# Patient Record
Sex: FEMALE | Race: WHITE | NOT HISPANIC OR LATINO | Employment: STUDENT | URBAN - METROPOLITAN AREA
[De-identification: names, ages, dates, MRNs, and addresses within clinical notes are randomized per-mention and may not be internally consistent; named-entity substitution may affect disease eponyms.]

---
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
## 2020-09-29 ENCOUNTER — COMMUNICATION - HEALTHEAST (OUTPATIENT)
Dept: SCHEDULING | Facility: CLINIC | Age: 20
End: 2020-09-29

## 2021-03-03 ENCOUNTER — COMMUNICATION - HEALTHEAST (OUTPATIENT)
Dept: FAMILY MEDICINE | Facility: CLINIC | Age: 21
End: 2021-03-03

## 2021-04-27 ENCOUNTER — OFFICE VISIT - HEALTHEAST (OUTPATIENT)
Dept: FAMILY MEDICINE | Facility: CLINIC | Age: 21
End: 2021-04-27

## 2021-04-27 ENCOUNTER — COMMUNICATION - HEALTHEAST (OUTPATIENT)
Dept: SCHEDULING | Facility: CLINIC | Age: 21
End: 2021-04-27

## 2021-04-27 DIAGNOSIS — J02.0 STREP PHARYNGITIS: ICD-10-CM

## 2021-04-27 LAB — DEPRECATED S PYO AG THROAT QL EIA: ABNORMAL

## 2021-05-04 ENCOUNTER — OFFICE VISIT - HEALTHEAST (OUTPATIENT)
Dept: FAMILY MEDICINE | Facility: CLINIC | Age: 21
End: 2021-05-04

## 2021-05-04 DIAGNOSIS — T83.32XA INTRAUTERINE CONTRACEPTIVE DEVICE THREADS LOST, INITIAL ENCOUNTER: ICD-10-CM

## 2021-05-27 VITALS — DIASTOLIC BLOOD PRESSURE: 58 MMHG | SYSTOLIC BLOOD PRESSURE: 100 MMHG | WEIGHT: 135.8 LBS | HEART RATE: 68 BPM

## 2021-05-31 VITALS — WEIGHT: 139.25 LBS

## 2021-05-31 VITALS — WEIGHT: 141.25 LBS

## 2021-06-01 NOTE — PROGRESS NOTES
ASSESSMENT/PLAN:  Intrauterine contraceptive device threads lost, initial encounter  -     US Pelvis With Transvaginal Non OB; Future    Vaginal bleeding  -     Wet Prep, Vaginal  -     Chlamydia trachomatis & Neisseria gonorrhoeae, Amplified Detection  -     US Pelvis With Transvaginal Non OB; Future    Orders Placed This Encounter   Procedures     Wet Prep, Vaginal     Chlamydia trachomatis & Neisseria gonorrhoeae, Amplified Detection     Order Specific Question:   Specimen Source?     Answer:   Endocervix     US Pelvis With Transvaginal Non OB     Standing Status:   Future     Standing Expiration Date:   10/1/2020     Order Specific Question:   Is the patient pregnant?     Answer:   No     Order Specific Question:   Can the procedure be changed per Radiologist protocol?     Answer:   Yes           CHIEF COMPLAINT:  Chief Complaint   Patient presents with     IUD check       HISTORY OF PRESENT ILLNESS:  Scarlet is a 18 y.o. female presenting to the clinic today for abnormal menstruation.     Abnormal Menstruation: She had a Kyleena IUD placed on November 16, 2017. She has been checking her strings since she got it. Since it was placed, she stopped menstruating and cramping. However, the last time she checked she could not find the strings. She also noticed that she randomly started menstruating and cramping again. She did not see the Kyleena fall out. She is concerned that the IUD is not correctly placed. She denies any other vaginal symptoms. She is not concerned about STDs.       REVIEW OF SYSTEMS:   Constitutional: Negative.   HENT: Negative.   Eyes: Negative.   Respiratory: Negative.   Cardiovascular: Negative.   Gastrointestinal: Negative.   Endocrine: Negative.   Genitourinary: Negative.   Musculoskeletal: Negative.   Skin: Negative.   Allergic/Immunologic: Negative.   Neurological: Negative.   Hematological: Negative.   Psychiatric/Behavioral: Negative.   All other systems are negative.    TOBACCO  "USE:  Social History     Tobacco Use   Smoking Status Never Smoker   Smokeless Tobacco Never Used   Tobacco Comment    no exposure        VITALS:  Vitals:    10/01/19 1111   BP: 100/70   Patient Site: Left Arm   Patient Position: Sitting   Cuff Size: Adult Regular   Pulse: 60   Weight: 142 lb 3 oz (64.5 kg)   Height: 5' 5\" (1.651 m)     Wt Readings from Last 3 Encounters:   10/01/19 142 lb 3 oz (64.5 kg) (74 %, Z= 0.66)*   11/16/17 139 lb 4 oz (63.2 kg) (77 %, Z= 0.73)*   09/07/17 141 lb 4 oz (64.1 kg) (79 %, Z= 0.82)*     * Growth percentiles are based on CDC (Girls, 2-20 Years) data.       PHYSICAL EXAM:  Constitutional: Patient is oriented to person, place, and time. Patient appears well-developed and well-nourished. No distress.   Head: Normocephalic and atraumatic.   Right Ear: External ear normal.   Left Ear: External ear normal.   Nose: Nose normal.   Eyes: Conjunctivae and EOM are normal. Right eye exhibits no discharge. Left eye exhibits no discharge. No scleral icterus.   Pelvic:  Normal external genitalia with Normal vulva.  Normal vagina with no polyps or lesions and with physiologic discharge.  Normal cervix with normal mucosa and without CMT.  No adnexal masses. IUD strings are non-visualized.   Neurological: Patient is alert and oriented to person, place, and time. No cranial nerve deficit. Coordination normal.   Skin: No rash noted. Patient is not diaphoretic. No erythema. No pallor.    ADDITIONAL HISTORY SUMMARIZED (2): None.  DECISION TO OBTAIN EXTRA INFORMATION (1): None.   RADIOLOGY TESTS (1): Ordered US Pelvic today.   LABS (1): Ordered a Wet Prep today.   MEDICINE TESTS (1): None.  INDEPENDENT REVIEW (2 each): None.     Yvonne GIBBS,  am scribing for and in the presence of, Dr. Ventura.    Dr. Audrey GIBBS, personally performed the services described in this documentation, as scribed by Yvonne Owusu in my presence, and it is both accurate and complete.    MEDICATIONS:  Current Outpatient " Medications   Medication Sig Dispense Refill     levonorgestrel (KYLEENA) 17.5 mcg/24 hrs (5 yrs) 19.5 mg IUD IUD by Intrauterine route once.       SPRINTEC, 28, 0.25-35 mg-mcg per tablet TAKE 1 TABLET BY MOUTH DAILY. 84 tablet 3     No current facility-administered medications for this visit.        Total data points: 2

## 2021-06-03 VITALS
WEIGHT: 142.19 LBS | HEART RATE: 60 BPM | HEIGHT: 65 IN | BODY MASS INDEX: 23.69 KG/M2 | SYSTOLIC BLOOD PRESSURE: 100 MMHG | DIASTOLIC BLOOD PRESSURE: 70 MMHG

## 2021-06-05 VITALS
OXYGEN SATURATION: 99 % | TEMPERATURE: 97.5 F | SYSTOLIC BLOOD PRESSURE: 98 MMHG | DIASTOLIC BLOOD PRESSURE: 70 MMHG | HEART RATE: 106 BPM | WEIGHT: 136 LBS | BODY MASS INDEX: 22.63 KG/M2

## 2021-06-12 NOTE — PROGRESS NOTES
ASSESSMENT/PLAN:  Menorrhagia even on oral contraceptives  Spoke about various forms of birth control from oral, transdermal, intravaginal, intramuscular, subdermal, intrauterine.  Also spoke about hormonal versus nonhormonal form of birth control.  Patient verbalized interest in the Kyleena IUD.  We spoke about the procedure, risks and benefit, and timing of insertion.  She will come back during her period week for insertion of Kyleena IUD.  Patient verbalized understanding and agree with plan    Need for immunization against influenza  -     Influenza, Seasonal,Quad Inj, 36+ MOS    Orders Placed This Encounter   Procedures     Influenza, Seasonal,Quad Inj, 36+ MOS     CHIEF COMPLAINT:  Chief Complaint   Patient presents with     menstrual cycle issue     on BC and bleeding can last for 3 weeks       HISTORY OF PRESENT ILLNESS:  Scarlet is a 16 y.o. female presenting to the clinic today for irregular vaginal bleeding. Here with mom today. She is currently taking OCP's. Menarche at age 14. She did have heavy, irregular periods, and this is why she was put on OCP's. Taking the OCP's have not controlled the bleeding very well. She still has periods that last 2 weeks. The periods are still heavy on the OCP's. She does not have much pain with her periods. Mom notes that she also had heavy irregular periods as a teenager. She is currently on her period. She and mom have discussed the possibility of an IUD to help her control the bleeding. Not sexually active. No concerns for STD's.     REVIEW OF SYSTEMS:   Constitutional: Negative.   HENT: Negative.   Eyes: Negative.   Respiratory: Negative.   Cardiovascular: Negative.   Gastrointestinal: Negative.   Endocrine: Negative.   Genitourinary: Negative.   Musculoskeletal: Negative.   Skin: Negative.   Allergic/Immunologic: Negative.   Neurological: Negative.   Hematological: Negative.   Psychiatric/Behavioral: Negative.   All other systems are negative.    PFSH:  Family  history of endometriosis in mother.     TOBACCO USE:  History   Smoking Status     Never Smoker   Smokeless Tobacco     Not on file     Comment: no exposure        VITALS:  Vitals:    09/07/17 1538   BP: 104/54   Patient Site: Left Arm   Patient Position: Sitting   Cuff Size: Adult Regular   Pulse: 72   Weight: 141 lb 4 oz (64.1 kg)     Wt Readings from Last 3 Encounters:   09/07/17 141 lb 4 oz (64.1 kg) (79 %, Z= 0.82)*   09/12/16 125 lb (56.7 kg) (62 %, Z= 0.30)*   06/01/16 123 lb 8 oz (56 kg) (61 %, Z= 0.28)*     * Growth percentiles are based on CDC 2-20 Years data.       PHYSICAL EXAM:  Constitutional: Patient is oriented to person, place, and time. Patient appears well-developed and well-nourished. No distress.   Cardiovascular: Normal rate.  Pulmonary/Chest: Effort normal. No respiratory distress.   Neurological: Patient is alert and oriented to person, place, and time.      Results for orders placed or performed in visit on 06/01/16   HM2(CBC w/o Differential)   Result Value Ref Range    WBC 6.4 4.5 - 13.0 thou/uL    RBC 4.02 (L) 4.10 - 5.10 mill/uL    Hemoglobin 12.7 12.0 - 16.0 g/dL    Hematocrit 37.0 33.0 - 51.0 %    MCV 92 78 - 102 fL    MCH 31.6 25.0 - 35.0 pg    MCHC 34.3 32.0 - 36.0 g/dL    RDW 11.2 (L) 11.5 - 14.0 %    Platelets 255 140 - 440 thou/uL    MPV 7.9 7.0 - 10.0 fL           ADDITIONAL HISTORY SUMMARIZED (2): Reviewed note from Dr. Rosa from 7/22/2015 regarding OCP's and irregular periods.  DECISION TO OBTAIN EXTRA INFORMATION (1): None.   RADIOLOGY TESTS (1): None.  LABS (1): None.  MEDICINE TESTS (1): None.  INDEPENDENT REVIEW (2 each): None.     The visit lasted a total of 25 minutes face to face with the patient. Over 50% of the time was spent counseling and educating the patient about her irregular periods.    Amber GIBBS, am scribing for and in the presence of, Dr. Ventura.    IDacia MD , personally performed the services described in this  documentation, as scribed by Amber Wilkins in my presence, and it is both accurate and complete.    MEDICATIONS:  Current Outpatient Prescriptions   Medication Sig Dispense Refill     SPRINTEC, 28, 0.25-35 mg-mcg per tablet TAKE 1 TABLET BY MOUTH DAILY. 84 tablet 3     No current facility-administered medications for this visit.        Total data points: 2

## 2021-06-14 NOTE — PROGRESS NOTES
IUD Insertion Procedure Note    Pre-operative Diagnosis: Contraception, Kyleena IUD Insertion    Post-operative Diagnosis: same    Indications: contraception    Procedure Details   Urine pregnancy test was done and result was negative.  The risks (including infection, bleeding, pain, and uterine perforation) and benefits of the procedure were explained to the patient and Written informed consent was obtained.      Cervix cleansed with Betadine. Uterus sounded to 7 cm. IUD inserted without difficulty. String visible and trimmed. Patient tolerated procedure well.    Condition:  Stable    Complications:  None    Plan:  The patient was advised to call for any fever or for prolonged or severe pain or bleeding. She was advised to use OTC analgesics as needed for mild to moderate pain.  She was advised to feel for the strings in 2 weeks or come in if she is unable to feel the strings.

## 2021-06-15 NOTE — TELEPHONE ENCOUNTER
Please assist the patient with an in person appointment with me.  Thank you.  Have an awesome day.

## 2021-06-16 PROBLEM — Z30.430 ENCOUNTER FOR IUD INSERTION: Status: ACTIVE | Noted: 2017-11-16

## 2021-06-17 NOTE — PROGRESS NOTES
Assessment & Plan     Strep pharyngitis    Patient was prescribed antibiotics as noted, to take as directed.  Patient warned of potential side effects of antibiotics, including but not limited to:  Nausea, diarrhea, yeast infection, and limitation of OCP effectiveness.     I advised the patient to continue symptomatic treatment with the appropriate over-the-counter medications, to drink plenty of fluids and get rest, and to follow up here if symptoms worsen or don't improve.     - Rapid Strep A Screen- Throat Swab  - amoxicillin (AMOXIL) 500 MG tablet; Take 1 tablet (500 mg total) by mouth 3 (three) times a day for 10 days.    Review of the result(s) of each unique test - strep test  Ordering of each unique test  Prescription drug management  :[id number]}     No follow-ups on file.    Joe Woo MD  [facility]   Scarlet Castro is 20 y.o. and presents today for the following health issues   HPI     Patient is here today with a sore throat, which is started since about 3 days ago.  She also started getting bit of popping and pain in her left ear today.  She has been complaining of it being hard for her to swallow.  She has had no temperatures to speak of.  No loss of taste or smell, no muscle aches.  No sinus pressure or drainage and no postnasal drainage.    Review of Systems    A comprehensive Review of Systems was performed, and other than the positives mentioned above, the ROS was negative.       Objective    BP 98/70 (Patient Site: Left Arm, Patient Position: Sitting, Cuff Size: Adult Regular)   Pulse (!) 106   Temp 97.5  F (36.4  C) (Oral)   Wt 136 lb (61.7 kg)   SpO2 99%   BMI 22.63 kg/m    Body mass index is 22.63 kg/m .  Physical Exam    Well-appearing female in no acute distress.  Vital signs as noted.  Ears are clear bilaterally.  Eyes and nose are normal.  Oropharynx is red in the back of the throat.  There is no whitish exudate seen.  There is no  lymphadenopathy anteriorly.  Chest clear to auscultation.    Recent Results (from the past 24 hour(s))   Rapid Strep A Screen- Throat Swab    Specimen: Throat   Result Value Ref Range    Rapid Strep A Antigen Group A Strep detected (!) No Group A Strep detected, presumptive negative

## 2021-06-17 NOTE — TELEPHONE ENCOUNTER
Triage call:     Obtained consent to speak to patients mom   Sent home from work  Sore throat  First noted on Sunday   Hoarse voice   Rating pain severe   Able to swallow  Unknown exposure to strep  Pain in her ear- left ear   Mom states she has pus on her tonsils    Advised that patient should be seen in the office today. Reviewed additional care advice with mom and she verbalizes understanding. Assisted in transferring to scheduling.     Cathleen Phoenix RN BSBA [facility] Triage/Med Refill 4/27/2021 9:54 AM    COVID 19 Nurse Triage Plan/Patient Instructions    Please be aware that novel coronavirus (COVID-19) may be circulating in the community. If you develop symptoms such as fever, cough, or SOB or if you have concerns about the presence of another infection including coronavirus (COVID-19), please contact your health care provider or visit  [email or web address].    Disposition/Instructions    In-Person Visit with provider recommended. Reference Visit Selection Guide.    Thank you for taking steps to prevent the spread of this virus.  o Limit your contact with others.  o Wear a simple mask to cover your cough.  o Wash your hands well and often.    Resources    [facility]: About COVID-19: [email or web address]    CDC: What to Do If You're Sick: www.cdc.gov/coronavirus/2019-ncov/about/steps-when-sick.html    CDC: Ending Home Isolation: www.cdc.gov/coronavirus/2019-ncov/hcp/disposition-in-home-patients.html     CDC: Caring for Someone: www.cdc.gov/coronavirus/2019-ncov/if-you-are-sick/care-for-someone.html     [facility]: Interim Guidance for Hospital Discharge to Home: www.health.state.[place]/diseases/coronavirus/hcp/hospdischarge.pdf    [facility] clinical trials (COVID-19 research studies): [email or web address]/[email or web address]     Below are the COVID-19 hotlines at the [place] Department of Health ([facility]). Interpreters are available.   o For health questions: Call  [phone] or 1-888.166.8152 (7 a.m. to 7 p.m.)  o For questions about schools and childcare: Call 438-924-7318 or 1-832.955.4710 (7 a.m. to 7 p.m.)         Reason for Disposition    SEVERE sore throat pain    Additional Information    Negative: SEVERE difficulty breathing (e.g., struggling for each breath, speaks in single words)    Negative: Sounds like a life-threatening emergency to the triager    Negative: Throat culture results, call about    Negative: Productive cough is the main symptom    Negative: Runny nose is the main symptom    Negative: Drooling or spitting out saliva (because can't swallow)    Negative: Unable to open mouth completely    Negative: Drinking very little and has signs of dehydration (e.g., no urine > 12 hours, very dry mouth, very lightheaded)    Negative: Patient sounds very sick or weak to the triager    Negative: Difficulty breathing (per caller) but not severe    Negative: Fever > 103 F (39.4 C)    Negative: Refuses to drink anything for > 12 hours    Protocols used: SORE THROAT-A-OH

## 2021-06-17 NOTE — PROGRESS NOTES
ASSESSMENT/PLAN:  Scarlet was seen today for contraception.    Diagnoses and all orders for this visit:    Intrauterine contraceptive device threads lost, initial encounter  -     US Pelvis With Transvaginal Non OB; Future  US to locate IUD      There are no Patient Instructions on file for this visit.    SUBJECTIVE:    Scarlet Castro is a 20 y.o. female who came in today     Kyleena IUD was placed November 16, 2017.  This year she started to notice lower abdominal cramping and bleeding.  Her mom asked her to come in to get her IUD checked.  She has been sexually active in the past but nothing currently.  She admitted to using condom for every sexual encounters that she has had in the past    Review of Systems (except those mentioned above)  Constitutional: Negative.   HENT: Negative.   Eyes: Negative.   Respiratory: Negative.   Cardiovascular: Negative.   Gastrointestinal: Negative.   Endocrine: Negative.   Genitourinary: Negative.   Musculoskeletal: Negative.   Skin: Negative.   Allergic/Immunologic: Negative.   Neurological: Negative.   Hematological: Negative.   Psychiatric/Behavioral: Negative.     Patient Active Problem List    Diagnosis Date Noted     Kyleena IUD inserted 11/16/17 11/16/2017     No Known Allergies  Current Outpatient Medications   Medication Sig Dispense Refill     levonorgestrel (KYLEENA) 17.5 mcg/24 hrs (5 yrs) 19.5 mg IUD IUD by Intrauterine route once.       amoxicillin (AMOXIL) 500 MG tablet Take 1 tablet (500 mg total) by mouth 3 (three) times a day for 10 days. 30 tablet 0     No current facility-administered medications for this visit.      No past medical history on file.  No past surgical history on file.  Social History     Socioeconomic History     Marital status: Single     Spouse name: None     Number of children: None     Years of education: None     Highest education level: None   Occupational History     None   Social Needs     Financial resource strain: None     Food  insecurity     Worry: None     Inability: None     Transportation needs     Medical: None     Non-medical: None   Tobacco Use     Smoking status: Never Smoker     Smokeless tobacco: Never Used     Tobacco comment: no exposure    Substance and Sexual Activity     Alcohol use: None     Drug use: None     Sexual activity: None   Lifestyle     Physical activity     Days per week: None     Minutes per session: None     Stress: None   Relationships     Social connections     Talks on phone: None     Gets together: None     Attends [employer] service: None     Active member of club or organization: None     Attends meetings of clubs or organizations: None     Relationship status: None     Intimate partner violence     Fear of current or ex partner: None     Emotionally abused: None     Physically abused: None     Forced sexual activity: None   Other Topics Concern     None   Social History Narrative     None     No family history on file.      OBJECTIVE:    Vitals:    05/04/21 1004   BP: 100/58   Patient Site: Left Arm   Patient Position: Sitting   Cuff Size: Adult Regular   Pulse: 68   Weight: 135 lb 12.8 oz (61.6 kg)     Body mass index is 22.6 kg/m .    Physical Exam:  Constitutional: Patient was oriented to person, place, and time. Patient appeared well-developed and well-nourished. No distress.   Head: Normocephalic and atraumatic.   Right Ear: External ear normal.   Left Ear: External ear normal.   Eyes: Conjunctivae and EOM were normal. Right eye exhibited no discharge. Left eye exhibited no discharge. No scleral icterus.   Skin: Skin was warm and dry. No rash noted. Patient was not diaphoretic. No erythema. No pallor.   Pelvic: Blood at the external os.  Strings were not visualized.  No adnexal mass

## 2021-06-19 NOTE — LETTER
Letter by Dacia Moss MD at      Author: Dacia Moss MD Service: -- Author Type: --    Filed:  Encounter Date: 10/2/2019 Status: Signed         Scarlet Castro  [address] [address]             October 2, 2019         Dear Ms. Castro,    Below are the results from your recent visit:    Resulted Orders   Wet Prep, Vaginal   Result Value Ref Range    Yeast Result No yeast seen No yeast seen    Trichomonas No Trichomonas seen No Trichomonas seen    Clue Cells, Wet Prep No Clue cells seen No Clue cells seen   Chlamydia trachomatis & Neisseria gonorrhoeae, Amplified Detection   Result Value Ref Range    Chlamydia trachomatis, Amplified Detection Negative Negative    Neisseria gonorrhoeae, Amplified Detection Negative Negative       Please call with questions or contact us using [employer].    Sincerely,        Electronically signed by Dacia Paz MD

## 2021-08-22 ENCOUNTER — HEALTH MAINTENANCE LETTER (OUTPATIENT)
Age: 21
End: 2021-08-22

## 2021-10-16 ENCOUNTER — HEALTH MAINTENANCE LETTER (OUTPATIENT)
Age: 21
End: 2021-10-16

## 2021-11-18 ENCOUNTER — VIRTUAL VISIT (OUTPATIENT)
Dept: FAMILY MEDICINE | Facility: CLINIC | Age: 21
End: 2021-11-18

## 2021-11-18 ENCOUNTER — TELEPHONE (OUTPATIENT)
Dept: FAMILY MEDICINE | Facility: CLINIC | Age: 21
End: 2021-11-18

## 2021-11-18 DIAGNOSIS — Z71.85 VACCINE COUNSELING: Primary | ICD-10-CM

## 2021-11-18 PROCEDURE — 99213 OFFICE O/P EST LOW 20 MIN: CPT | Mod: 95 | Performed by: FAMILY MEDICINE

## 2021-11-18 PROCEDURE — 86787 VARICELLA-ZOSTER ANTIBODY: CPT | Performed by: FAMILY MEDICINE

## 2021-11-18 PROCEDURE — 86706 HEP B SURFACE ANTIBODY: CPT | Performed by: FAMILY MEDICINE

## 2021-11-18 PROCEDURE — 36415 COLL VENOUS BLD VENIPUNCTURE: CPT | Performed by: FAMILY MEDICINE

## 2021-11-18 NOTE — TELEPHONE ENCOUNTER
Pt is coming in for immunizations. In appointment line it says Hep B, and varicella. Pt needs orders for these.

## 2021-11-18 NOTE — TELEPHONE ENCOUNTER
I would like to see her to discuss vaccines, her history of vaccine, and reason for these vaccines.  Okay to double book today for video visit anytime prior to 2 PM

## 2021-11-18 NOTE — PROGRESS NOTES
Scarlet is a 21 year old who is being evaluated via a billable video visit.      How would you like to obtain your AVS? MyChart  If the video visit is dropped, the invitation should be resent by: Text to cell phone: [phone]  Will anyone else be joining your video visit? 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    Physical Exam   Constitutional: Patient is oriented to person, place, and time. Patient appears well-developed and well-nourished. No distress.   Head: Normocephalic and atraumatic.   Right Ear: External ear normal.   Left Ear: External ear normal.   Eyes: Conjunctivae and EOM are normal. Right eye exhibits no discharge. Left eye exhibits no discharge. No scleral icterus.   Neurological: Patient is alert and oriented to person, place, and time.  Skin: No rash noted. Patient is not diaphoretic. No erythema. No pallor.    Video-Visit Details    Type of service:  Video Visit    Video End Time:2:14 PM    Originating Location (pt. Location): Home    Distant Location (provider location):  [facility]     [facility] for [facility] Visit[facility]

## 2021-11-19 LAB
HBV SURFACE AB SERPLBLD QL IA.RAPID: NEGATIVE
VZV IGG SER QL IA: >4000 INDEX
VZV IGG SER QL IA: POSITIVE

## 2021-11-24 ENCOUNTER — NURSE TRIAGE (OUTPATIENT)
Dept: NURSING | Facility: CLINIC | Age: 21
End: 2021-11-24

## 2021-11-24 DIAGNOSIS — Z23 ENCOUNTER FOR ADMINISTRATION OF VACCINE: Primary | ICD-10-CM

## 2021-11-24 NOTE — TELEPHONE ENCOUNTER
LM informing mom that order is placed and to call back main number to schedule shot only appointment.

## 2021-11-24 NOTE — TELEPHONE ENCOUNTER
Consent to talk to mom about healthcare from patient.    Had bloodwork last week. It showed she needs the Hep B series.  She needs orders for Hep B series in her chart.    Also needs a copy of the varicella lab results for [facility].    She would like call back when these orders have been placed so she can make a lab only appointment.    Brittnee Tompkins RN  [facility] Nurse Advisor

## 2022-10-01 ENCOUNTER — HEALTH MAINTENANCE LETTER (OUTPATIENT)
Age: 22
End: 2022-10-01

## 2024-09-29 ENCOUNTER — HEALTH MAINTENANCE LETTER (OUTPATIENT)
Age: 24
End: 2024-09-29